# Patient Record
Sex: MALE | Race: WHITE
[De-identification: names, ages, dates, MRNs, and addresses within clinical notes are randomized per-mention and may not be internally consistent; named-entity substitution may affect disease eponyms.]

---

## 2018-12-06 ENCOUNTER — HOSPITAL ENCOUNTER (OUTPATIENT)
Dept: HOSPITAL 80 - FSGY | Age: 73
Discharge: HOME | End: 2018-12-06
Attending: INTERNAL MEDICINE
Payer: COMMERCIAL

## 2018-12-06 VITALS — SYSTOLIC BLOOD PRESSURE: 128 MMHG | DIASTOLIC BLOOD PRESSURE: 67 MMHG

## 2018-12-06 DIAGNOSIS — K57.30: ICD-10-CM

## 2018-12-06 DIAGNOSIS — Z09: ICD-10-CM

## 2018-12-06 DIAGNOSIS — Z96.652: ICD-10-CM

## 2018-12-06 DIAGNOSIS — D12.2: Primary | ICD-10-CM

## 2018-12-06 DIAGNOSIS — D12.0: ICD-10-CM

## 2018-12-06 DIAGNOSIS — K64.8: ICD-10-CM

## 2018-12-06 DIAGNOSIS — Z86.010: ICD-10-CM

## 2018-12-06 DIAGNOSIS — Z95.5: ICD-10-CM

## 2018-12-06 PROCEDURE — 0DBK8ZX EXCISION OF ASCENDING COLON, VIA NATURAL OR ARTIFICIAL OPENING ENDOSCOPIC, DIAGNOSTIC: ICD-10-PCS | Performed by: INTERNAL MEDICINE

## 2018-12-06 PROCEDURE — 0D5H8ZZ DESTRUCTION OF CECUM, VIA NATURAL OR ARTIFICIAL OPENING ENDOSCOPIC: ICD-10-PCS | Performed by: INTERNAL MEDICINE

## 2018-12-06 PROCEDURE — 0DBH8ZX EXCISION OF CECUM, VIA NATURAL OR ARTIFICIAL OPENING ENDOSCOPIC, DIAGNOSTIC: ICD-10-PCS | Performed by: INTERNAL MEDICINE

## 2018-12-06 NOTE — PDANEPAE
ANE History of Present Illness





72 yo with hx of polyps





ANE Past Medical History





- Cardiovascular History


Hx Hypertension: Yes


Hx Arrhythmias: No


Hx Chest Pain: Yes


Hx Coronary Artery / Peripheral Vascular Disease: Yes


Hx CHF / Valvular Disease: No


Hx Palpitations: No


Cardiovascular History Comment: august 2018 non cardiac event.  5 stents





- Pulmonary History


Hx COPD: No


Hx Asthma/Reactive Airway Disease: No


Hx Recent Upper Respiratory Infection: No


Hx Oxygen in Use at Home: No


Hx Sleep Apnea: Yes


Sleep Apnea Screening Result - Last Documented: Positive





- Neurologic History


Hx Cerebrovascular Accident: No


Hx Seizures: No


Hx Dementia: No





- Endocrine History


Hx Diabetes: No


Hypothyroid: No


Hyperthyroid: No


Obesity: no





- Renal History


Hx Renal Disorders: No





- Liver History


Hx Hepatic Disorders: No





- Neurological & Psychiatric Hx


Hx Neurological and Psychiatric Disorders: No





- Cancer History


Hx Cancer: No





- Congenital Disorder History


Hx Congenital Disorders: No





- GI History


GERD: mild


Hx Gastrointestinal Disorders: Yes


Gastrointestinal History Comment: reflux, polyps





- Other Health History


Other Health History: none





- Chronic Pain History


Chronic Pain: No





- Surgical History


Prior Surgeries: L ankle replacement.  L eye cataract





ANE Review of Systems


Review of systems is: negative


Review of Systems: 








- Exercise capacity


METS (RN): 4 METS





ANE Patient History





- Allergies


Allergies/Adverse Reactions: 








codeine phosphate [From Robitussin A-C] Allergy (Intermediate, Verified 12/05/ 18 12:01)


 Hives


dextromethorphan HBr [From Robitussin Maximum Strength] Allergy (Intermediate, 

Verified 12/05/18 12:01)


 Hives


guaifenesin [From Robitussin A-C] Allergy (Intermediate, Verified 12/05/18 12:01

)


 Hives


pseudoephedrine HCl [From Robitussin Maximum Strength] Allergy (Intermediate, 

Verified 12/05/18 12:01)


 Hives








- Home Medications


Home medications: home medication list seen and reviewed


Home Medications: 








Aspirin [Aspirin 81mg (OTC)]  06/16/12 [Last Taken 06/16/12]


Clopidogrel Bisulfate [Plavix (RX)]  06/16/12 [Last Taken 06/16/12]


Multivit with Calcium,Iron,Min [Tab A Prabhu]  06/16/12 [Last Taken 06/16/12]


Niacin ER [Niaspan 1000 mg (RX)]  06/16/12 [Last Taken 06/15/12]


Niacin [Niacor]  06/16/12 [Last Taken 06/16/12]


Omeprazole Magnesium [Prilosec Otc]  06/16/12 [Last Taken 06/15/12]


Rosuvastatin Calcium [Crestor 40mg (RX)]  06/16/12 [Last Taken 06/16/12]


Nitroglycerin [Nitrostat 0.4 mg (*)]  12/04/18 [Last Taken Unknown]


Ezetimibe  12/05/18 [Last Taken Unknown]


Metoprolol Tartrate  12/05/18 [Last Taken Unknown]








- NPO status


NPO Status: no food or drink >8 hours


NPO Since - Liquids (Date): 12/05/18


NPO Since - Liquids (Time): 08:00


NPO Since - Solids (Date): 12/05/18


NPO Since - Solids (Time): 08:00





- Anes Hx


Anes Hx: no prior problems





- Smoking Hx


Smoking Status: Former smoker





- Alcohol Use


Alcohol Use: Rarely





- Family Anes Hx


Family Anes Hx: none


Family Hx Anesthesia Complications: none





ANE Labs/Vital Signs





- Vital Signs


Blood Pressure: 140/85


Heart Rate: 80


Respiratory Rate: 18


O2 Sat (%): 92


Height: 172.72 cm


Weight: 88.451 kg





ANE Physical Exam





- Airway


Neck exam: FROM


Mallampati Score: Class 2


Mouth exam: normal dental/mouth exam





- Pulmonary


Pulmonary: no respiratory distress, clear to auscultation





- Cardiovascular


Cardiovascular: regular rate and rhythym, no murmur, rub, or gallop





- ASA Status


ASA Status: III





ANE Anesthesia Plan


Anesthesia Plan: GA with mask

## 2018-12-06 NOTE — PDGENHP
History & Physical


Chief Complaint: Colon polyp


History of Present Illness: Previous colonscopy with large TVA cecum, piecmeal 

excised.  Presents to re-evaluate polyp site for residual tissue.


Pertinent Past, Social, Family History: negative as pertains to cc/HPI


Cardiorespiratory Assessment: Lungs clear.  Cardiac noraml s1s2

## 2018-12-06 NOTE — GIREPORT
[facility]

Surgical Services - Endoscopy Department

_____________________________________________________________________________________________________
_______

Patient Name: Carlos Vallecillo                           Procedure Date: 12/6/2018 8:27 AM

MRN: [id number]                                       Account Number: [id number]

Patient Type: Outpatient                             Attending  MD/ ER Physician: Seth Zavala MD


_____________________________________________________________________________________________________
_______

 

Procedure:                    Colonoscopy

Indications:                  Adenomatous polyps in the colon, Follow-up for history of adenomatous p
olyps 

                              in the colon

Providers:                    Seth Zavala MD

Medicines:                    Propofol per Anesthesia

Complications:                No immediate complications.

Description of Procedure:     After obtaining informed consent, the scope was passed under direct vis
ion. 

                              Throughout the procedure, the patient's blood pressure, pulse, and oxyg
en 

                              saturations were monitored continuously. The Colonoscope with irrigatio
n 

                              channel was introduced through the anus and advanced to the cecum, 

                              identified by appendiceal orifice and ileocecal valve. The colonoscopy 
was 

                              performed without difficulty. The patient tolerated the procedure well.
 The 

                              quality of the bowel preparation was good. The ileocecal valve, appendi
ceal 

                              orifice, and rectum were photographed.

Findings:                     Many small and large-mouthed diverticula were found in the sigmoid colo
n, 

                              descending colon, transverse colon and ascending colon.

                              Two sessile polyps were found in the proximal ascending colon. The poly
ps 

                              were 2 to 3 mm in size. These polyps were removed with a cold biopsy 

                              forceps. Resection and retrieval were complete.

                              A 8 mm polyp was found in the cecum. Residual polyp from previous polyp
 with 

                              scar. The polyp was flat. The polyp was removed with a cold biopsy forc
eps. 

                              Polyp resection was incomplete. The resected tissue was retrieved. 

                              Coagulation for destruction of remaining portion of lesion using argon 
beam 

                              was successful. Area was tattooed with an injection of 1 mL of Spot (ca
rbon 

                              black).

                              Internal hemorrhoids were found during retroflexion. The hemorrhoids we
re 

                              medium-sized.

Estimated Blood Loss:         Estimated blood loss: none.

Post Op Diagnosis:            - Diverticulosis in the sigmoid colon, in the descending colon, in the 


                              transverse colon and in the ascending colon.

                              - Two 2 to 3 mm polyps in the proximal ascending colon, removed with a 
cold 

                              biopsy forceps. Resected and retrieved.

                              - One 8 mm polyp in the cecum, removed with a cold biopsy forceps. Resi
dual 

                              polyp with associated scar. Incomplete resection. Resected tissue retri
eved. 

                              Treated with argon beam coagulation. Tattooed.

                              - Internal hemorrhoids.

Recommendation:               - Patient has a contact number available for emergencies. The signs and
 

                              symptoms of potential delayed complications were discussed with the pat
ient. 

                              Return to normal activities tomorrow. Written discharge instructions we
re 

                              provided to the patient.

                              - High fiber diet.

                              - Continue present medications.

                              - Await pathology results.

                              - Repeat colonoscopy is recommended. The colonoscopy date will be deter
mined 

                              after pathology results from today's exam become available for review.

                              - If the pathology report reveals adenomatous tissue, then repeat the 

                              colonoscopy for surveillance in 1 year.

                              - Thank you for allowing me to participate in the care of your patient.


Attending Participation:

     I personally performed the entire procedure.

 

Seth Zavala MD

__________________

Seth Zavala MD

12/6/2018 9:41:37 AM

This report has been signed electronicallyStfelipe Zavala MD

Number of Addenda: 0

 

Note Initiated On: 12/6/2018 8:27 AM

Total Procedure Duration Time 0 hours 16 minutes 48 seconds 

[email or web address]

## 2019-02-28 ENCOUNTER — HOSPITAL ENCOUNTER (OUTPATIENT)
Dept: HOSPITAL 80 - FCPNEURO | Age: 74
End: 2019-02-28
Payer: COMMERCIAL